# Patient Record
(demographics unavailable — no encounter records)

---

## 2025-03-28 NOTE — DATA REVIEWED
[FreeTextEntry1] : The TSH is normal, upper normal and the free t4 is also normal. The FBS was borderline elevated as well as the HbA1c. His PSA is higher than before.

## 2025-03-28 NOTE — HISTORY OF PRESENT ILLNESS
[FreeTextEntry1] : Patient is doing well, denies feeling tired, having cold intolerance, or palpitations. Denies dryness of the skin or hair loss. He denies chest pain or SOB. Taking the Levothyroxine regularly 1/2 hour before breakfast. No weight change. He says that 2 years ago he saw a  apparently, he was told that he needed some surgical procedure, but he did not do it. His PSA was elevated. He says his brother is a doctor and he consults with him.

## 2025-03-28 NOTE — ASSESSMENT
[FreeTextEntry1] : Patient is clinically euthyroid Will continue the same Levothyroxine dose 50 mcg po QD He has not done a recent US thyroid Will order one today Strongly advised to see a  doctor again for the elevated PSA result, and his PCP* Given copies of the PSA result.

## 2025-06-04 NOTE — REASON FOR VISIT
[Annual Wellness Visit] : an annual wellness visit [FreeTextEntry1] : subsequent Annual Wellness Visit.

## 2025-06-04 NOTE — HISTORY OF PRESENT ILLNESS
[FreeTextEntry1] : Patient is present today to establish care and for a subsequent Annual Wellness Visit. [de-identified] : Patient is an 80yr old M who is present today to establish care and for a subsequent Annual Wellness Visit.  Patient is doing well overall. UTD with Bone Density, slight osteopenia. Also UTD with Titers, Sonogram, and Hep-C testing, everything normal. Pt reports staying active with exercise and weightlifting in gym. Pt request surveillance of ALT, AST, and Hep-C RNA levels with bloodwork. Confirms stable memory and vision.     Denies any CP, chest tightness or SOB. Denies any abdominal pain, urinary symptom, or change in bowel habits. Denies any fever, chills, or night sweats.

## 2025-06-04 NOTE — ADDENDUM
[FreeTextEntry1] :  I, Joselyn Jackson, acted as a scribe on behalf of Dr. Colton Lugo MD, on 06/03/2025.  All medical entries made by the scribe were at my, Dr. Colton Lugo MD, direction and personally dictated by me on 06/03/2025. I have reviewed the chart and agree that the record accurately reflects my personal performance of the history, physical exam, assessment and plan. I have also personally directed, reviewed, and agreed with the chart.

## 2025-06-04 NOTE — HEALTH RISK ASSESSMENT
[Good] : ~his/her~  mood as  good [No] : In the past 12 months have you used drugs other than those required for medical reasons? No [0] : 2) Feeling down, depressed, or hopeless: Not at all (0) [PHQ-2 Negative - No further assessment needed] : PHQ-2 Negative - No further assessment needed [No falls in past year] : Patient reported no falls in the past year [None] : Patient does not have any barriers to medication adherence [Never] : Never [Patient reported bone density results were abnormal] : Patient reported bone density results were abnormal [Smoke Detector] : smoke detector [Carbon Monoxide Detector] : carbon monoxide detector [Safety elements used in home] : safety elements used in home [Seat Belt] :  uses seat belt [Sunscreen] : uses sunscreen [NO] : No [[id number]] : 0 [FreeTextEntry1] : health maintenance [de-identified] : Most recent (1yr): ENDO (Mar 28 2025) [Yes] : Reviewed medication list for presence of high-risk medications. [Change in mental status noted] : No change in mental status noted [Language] : denies difficulty with language [Behavior] : denies difficulty with behavior [Learning/Retaining New Information] : denies difficulty learning/retaining new information [Handling Complex Tasks] : denies difficulty handling complex tasks [Reasoning] : denies difficulty with reasoning [Spatial Ability and Orientation] : denies difficulty with spatial ability and orientation [With Significant Other] : lives with significant other [Fully functional (bathing, dressing, toileting, transferring, walking, feeding)] : Fully functional (bathing, dressing, toileting, transferring, walking, feeding) [Fully functional (using the telephone, shopping, preparing meals, housekeeping, doing laundry, using] : Fully functional and needs no help or supervision to perform IADLs (using the telephone, shopping, preparing meals, housekeeping, doing laundry, using transportation, managing medications and managing finances) [Reports changes in hearing] : Reports no changes in hearing [Reports changes in vision] : Reports no changes in vision [Reports changes in dental health] : Reports no changes in dental health [Travel to Developing Areas] : does not  travel to developing areas [TB Exposure] : is not being exposed to tuberculosis [Caregiver Concerns] : does not have caregiver concerns [BoneDensityDate] : 04/25 [BoneDensityComments] : Osteopenia in left hip [ColonoscopyComments] : n/a [Patient/Caregiver not ready to engage] : , patient/caregiver not ready to engage [AdvancecareDate] : 6/25

## 2025-06-04 NOTE — HISTORY OF PRESENT ILLNESS
[FreeTextEntry1] : Patient is present today to establish care and for a subsequent Annual Wellness Visit. [de-identified] : Patient is an 80yr old M who is present today to establish care and for a subsequent Annual Wellness Visit.  Patient is doing well overall. UTD with Bone Density, slight osteopenia. Also UTD with Titers, Sonogram, and Hep-C testing, everything normal. Pt reports staying active with exercise and weightlifting in gym. Pt request surveillance of ALT, AST, and Hep-C RNA levels with bloodwork. Confirms stable memory and vision.     Denies any CP, chest tightness or SOB. Denies any abdominal pain, urinary symptom, or change in bowel habits. Denies any fever, chills, or night sweats.

## 2025-06-04 NOTE — ASSESSMENT
[Vaccines Reviewed] : Immunizations reviewed today. Please see immunization details in the vaccine log within the immunization flowsheet.  [FreeTextEntry1] : Annual Wellness Visit: Elevated PSA, Hepatitis-C, Hypothyroidism, Medical annual wellness visit (subsequent) - BP is stable. Continue current management. - Check A1c, CBC, CMP, Lipid profile, TSH, Urinalysis, Vitamin levels - Order Hepatitis C RNA Detection by PCR, PSA Profile   - RTO annually or as needed.   Pt verbalized understanding and will reach out should any questions/concerns occur.